# Patient Record
Sex: FEMALE | Race: WHITE
[De-identification: names, ages, dates, MRNs, and addresses within clinical notes are randomized per-mention and may not be internally consistent; named-entity substitution may affect disease eponyms.]

---

## 2018-04-30 ENCOUNTER — HOSPITAL ENCOUNTER (OUTPATIENT)
Dept: HOSPITAL 96 - M.CT | Age: 53
End: 2018-04-30
Attending: FAMILY MEDICINE
Payer: COMMERCIAL

## 2018-04-30 DIAGNOSIS — Z13.6: Primary | ICD-10-CM

## 2018-05-10 ENCOUNTER — HOSPITAL ENCOUNTER (OUTPATIENT)
Dept: HOSPITAL 96 - M.NUC | Age: 53
End: 2018-05-10
Attending: INTERNAL MEDICINE
Payer: COMMERCIAL

## 2018-05-10 DIAGNOSIS — I25.10: Primary | ICD-10-CM

## 2018-05-15 ENCOUNTER — HOSPITAL ENCOUNTER (OUTPATIENT)
Dept: HOSPITAL 96 - M.CRD | Age: 53
End: 2018-05-15
Attending: INTERNAL MEDICINE
Payer: COMMERCIAL

## 2018-05-15 DIAGNOSIS — I34.0: ICD-10-CM

## 2018-05-15 DIAGNOSIS — I51.7: Primary | ICD-10-CM

## 2018-05-15 NOTE — 2DMMODE
[place] [address]
Phone:  [phone] 2 D/M-MODE ECHOCARDIOGRAM     
_______________________________________________________________________________
 
Name:         DANIELSPAUL ROLANDO             Room:                     REG CLI
M.R.#:    [id number]     Account #:     [id number]  
Admission:    05/15/18    Attend Phys:   Cash Judd,
Discharge:                Date of Birth: [birth year]  
Date of Service: 05/15/18 1425  Report #:      [id number]
        [id number]
_______________________________________________________________________________
THIS REPORT FOR:  //name//                      
 
 
--------------- APPROVED REPORT --------------
 
 
Study performed:  05/15/2018 09:25:06
 
EXAM: Comprehensive 2D, Doppler, and color-flow 
Echocardiogram 
Patient Location: Out-Patient   
      Status:  routine
 
      BSA:         1.99
HR: 65 bpm BP:          126/88 mmHg 
 
Other Information 
Study Quality: Good
 
Indications
Chest Pain
 
2D Dimensions
   LVEF(%):  70.05 (&gt;50%)
IVSd:  13.83 (7-11mm) LVOT Diam:  20.28 (18-24mm) 
LVDd:  42.00 mm  
PWd:  10.46 (7-11mm) Ascending Ao:  30.47 (22-36mm)
LVDs:  25.53 (25-40mm) 
Aortic Root:  22.30 mm 
   Mondragon's LVEF:  70.05 %
 
Volumes
Left Atrial Volume (Systole) 
    LA ESV Index:  11.00 mL/m2
 
Aortic Valve
AoV Peak Luis Miguel.:  1.44 m/s 
AO Peak Gr.:  8.32 mmHg  LVOT Max P.88 mmHg
AO Mean Gr.:  4.07 mmHg  LVOT Mean P.55 mmHg
    LVOT Max V:  1.10 m/s
AO V2 VTI:  26.58 cm  LVOT Mean V:  0.74 m/s
RONALD (VTI):  2.91 cm2  LVOT V1 VTI:  23.93 cm
 
Mitral Valve
    E/A Ratio:  0.96
    MV Decel. Time:  341.87 ms
 
 
[place] [address]
Phone:  [phone]                     2 D/M-MODE ECHOCARDIOGRAM     
_______________________________________________________________________________
 
Name:         PAUL DANIELS ROLANDO             Room:                     REG CLI
M.R.#:    [id number]     Account #:     [id number]  
Admission:    05/15/18    Attend Phys:   Cash Judd,
Discharge:                Date of Birth: [birth year]  
Date of Service: 05/15/18 1425  Report #:      [id number]
        [id number]
_______________________________________________________________________________
MV E Max Luis Miguel.:  0.59 m/s 
MV PHT:  99.14 ms  
MVA (PHT):  2.22 cm2  
 
TDI
E/Lateral E':  5.36 E/Medial E':  6.56
   Medial E' Luis Miguel.:  0.09 m/s
   Lateral E' Luis Miguel.:  0.11 m/s
 
Pulmonary Valve
PV Peak Luis Miguel.:  0.98 m/s PV Peak Gr.:  3.87 mmHg
 
Left Ventricle
The left ventricle is normal size. There is normal LV segmental wall 
motion. Mild concentric left ventricular hypertrophy. Left 
ventricular systolic function is normal. LVEF is 55-60%. Transmitral 
Doppler flow pattern suggests impaired LV relaxation.
 
Right Ventricle
The right ventricle is normal size. The right ventricular systolic 
function is normal.
 
Atria
The left atrium size is normal. The right atrium size is 
normal.
 
Aortic Valve
The aortic valve is normal in structure. No aortic regurgitation is 
present. There is no aortic valvular stenosis.
 
Mitral Valve
The mitral valve is normal in structure. Mild mitral regurgitation. 
No evidence of mitral valve stenosis.
 
Tricuspid Valve
The tricuspid valve is normal in structure. There is no tricuspid 
valve regurgitation noted.
 
Pulmonic Valve
The pulmonary valve is normal in structure. There is no pulmonic 
valvular regurgitation.
 
Great Vessels
The aortic root is normal in size. IVC is normal in size and 
collapses with &gt;50% inspiration
 
 
 
[place] [address]
Phone:  [phone]                     2 D/M-MODE ECHOCARDIOGRAM     
_______________________________________________________________________________
 
Name:         PAUL DANIELS             Room:                     REG CLI
M.R.#:    [id number]     Account #:     [id number]  
Admission:    05/15/18    Attend Phys:   Cash Judd,
Discharge:                Date of Birth: [birth year]  
Date of Service: 05/15/18 1425  Report #:      [id number]
        [id number]
_______________________________________________________________________________
Pericardium
There is no pericardial effusion.
 
&lt;Conclusion&gt;
The left ventricle is normal size.
Mild concentric left ventricular hypertrophy.
Left ventricular systolic function is normal.
LVEF is 55-60%.
Transmitral Doppler flow pattern suggests impaired LV 
relaxation.
Mild mitral regurgitation.
IVC is normal in size and collapses with &gt;50% inspiration
 
 
 
 
 
 
 
 
 
 
 
 
 
 
 
 
 
 
 
 
 
 
 
 
 
 
 
 
 
 
 
 
  <ELECTRONICALLY SIGNED>
                                           By: Gigi Gonzalez MD, [facility]   
  05/15/18     1425
D: 05/15/18 1425   _____________________________________
T: 05/15/18 1425   Gigi Gonzalez MD, FACC     /INF

## 2018-05-17 NOTE — CARDNUC
[place] [address]
Phone:  [phone]                     CARDIAC NUCLEAR IMAGING REPORT
_______________________________________________________________________________
 
Name:         JEREMIAHPAUL ROLANDO             Room:                     REG CLI
M.R.#:    [id number]     Account #:     [id number]  
Admission:    05/10/18    Attend Phys:   Cash Judd,
Discharge:                Date of Birth: [birth year]  
Date of Service: 05/17/18 1838  Report #:      [id number]
        [id number] 
_______________________________________________________________________________
THIS REPORT FOR:  //name//                      
 
 
--------------- APPROVED REPORT --------------
 
 
Study performed:  05/10/2018 08:45:00
 
Exam: Nuclear Stress Test
Indication: abnormal ekg, ashd, chest pain
Patient Location: Out-Patient
Stress Tech: Jackeline Osito
Stress Nurse: Esther Dominguez RN
 
Ht: 5 ft 6 in  Wt: 198 lbs  BSA:  1.99 m2
    BMI:  31.95
 
Medical History
Medical History: ashd
Medications: levothyroxine
Allergies: nkda
Cardiac Risk Factors: family hx
Exercise History: Physically active
 
Stress Test Details
Stress Test:  Pharmacologic stress testing performed using 0.4 mg of 
regadenoson per 5 mL given IV over 10 seconds.      
  Reason for pharmacologic stress test: physical 
limitation.      
HR           
Resting HR:            63 bpm   Max Heart Rate (APMHR): 169 bpm  
Max HR Achieved:  103 bpm   Target HR (85% APMHR): 143 bpm  
% of APMHR:         60         
Recovery HR:            88 bpm        
HR response to stress: Normal HR response to stress      
 
BP           
Resting BP:  142/95 mmHg        
Max BP:       140/73 mmHg        
 
BP response to stress: Normal blood pressure response to 
stress.      
ECG           
Resting ECG:  Sinus Rhythm        
Stress ECG:     Sinus Rhythm        
Recovery ECG: Sinus Rhythm        
 
 
[place] [address]
Phone:  [phone]                     CARDIAC NUCLEAR IMAGING REPORT
_______________________________________________________________________________
 
Name:         PAUL DANIELS             Room:                     REG CLI
M.R.#:    [id number]     Account #:     [id number]  
Admission:    05/10/18    Attend Phys:   Cash Judd,
Discharge:                Date of Birth: [birth year]  
Date of Service: 05/17/18 1838  Report #:      [id number]
        [id number] 
_______________________________________________________________________________
 
Clinical
Reason for Termination: Completed protocol
Stress Symptoms: none
Exercise duration: 0 min  sec
Exercise capacity: 1 METs
Functional Aerobic Impairment  61%
 
Nurse Comments
pt tolerated well
 
Stress ECG Conclusion
negative
 
NM EXAM: Myocardial Perfusion REST/STRESS
Imaging Protocol: Rest Tc-99m/Stress Tc-99m 1 day
 
Resting Data
Rest SPECT myocardial perfusion imaging was performed in supine 
position 30 minutes following the intravenous injection of 10.7 mCi 
of Tc-99m Sestamibi.
Time of rest injection: 0855     
The images were gated to evaluate regional wall motion and calculate 
left ventricular ejection fraction. 
Administration Route: IV
Administration Site: Right Hand
 
Pharmacologic Stress
Pharmacologic stress test was performed by injecting Regadenoson 0.4 
mg IV push followed by the intravenous injection of 36.0 mCi of 
Tc-99m Sestamibi.
Time of stress injection: 1010     
Administration Route: IV
Administration Site: Right Hand
Heart Rate at time of stress injection: 103 bpm.
The images were gated to evaluate regional wall motion and calculate 
left ventricular ejection fraction. 
Prone imaging was performed.
 
Study Quality
Study: Good
Artifact: Mild Breast artifact
Lung Uptake: Normal
 
Study Data
At rest, the left ventricular ejection fraction was 68%..   
 
 
[place] [address]
Phone:  [phone]                     CARDIAC NUCLEAR IMAGING REPORT
_______________________________________________________________________________
 
Name:         PAUL DANIELS             Room:                     REG CLI
M.R.#:    [id number]     Account #:     [id number]  
Admission:    05/10/18    Attend Phys:   Cash Judd,
Discharge:                Date of Birth: [birth year]  
Date of Service: 05/17/18 1838  Report #:      [id number]
        [id number] 
_______________________________________________________________________________
Post stress, the left ventricular ejection was 72%..   
SSS: 3
SRS: 3
SDS: 0      
 
Perfusion
There is a moderate sized, mild intensity fixed anterior and 
anterolateral defect seen in SPECT rest and post-stress imaging 
datasets. Normal perfusion is seen in all other segments. Prone 
images show a relatively normal perfusion in all segments.There is a 
corresponding prominent breast shadow on rotating images.
Images were reviewed using [employer].
 
Wall Motion
normal all segments
 
Nuclear Conclusion
ECG Findings: negative for ischemia
Clinical Findings: negative for ischemia
Nuclear Findings: negative for ischemia
Exercise Capacity: not assessed
Left Ventricular Function: normal
Risk Study: low
Negative nuclear stress test for ischemia. 
 
&lt;Conclusion&gt;
negative
 
 
 
 
 
 
 
 
 
 
 
 
 
 
 
 
 
  <ELECTRONICALLY SIGNED>
                                           By: Gigi Gonzalez MD, FACC   
  05/17/18     1838
D: 05/17/18 1838   _____________________________________
T: 05/17/18 1838   Gigi Gonzalez MD, FAC     /INF